# Patient Record
Sex: FEMALE | Race: WHITE | NOT HISPANIC OR LATINO | Employment: FULL TIME | URBAN - NONMETROPOLITAN AREA
[De-identification: names, ages, dates, MRNs, and addresses within clinical notes are randomized per-mention and may not be internally consistent; named-entity substitution may affect disease eponyms.]

---

## 2017-01-11 RX ORDER — ESCITALOPRAM OXALATE 20 MG/1
TABLET ORAL
Qty: 30 TABLET | Refills: 6 | Status: SHIPPED | OUTPATIENT
Start: 2017-01-11 | End: 2017-01-16 | Stop reason: SDUPTHER

## 2017-01-13 PROBLEM — E78.00 ELEVATED CHOLESTEROL: Status: ACTIVE | Noted: 2017-01-13

## 2017-01-13 PROBLEM — R51.9 HEADACHE: Status: ACTIVE | Noted: 2017-01-13

## 2017-01-13 PROBLEM — D75.839 THROMBOCYTOSIS: Status: ACTIVE | Noted: 2017-01-13

## 2017-01-13 PROBLEM — G43.109 OCULAR MIGRAINE: Status: ACTIVE | Noted: 2017-01-13

## 2017-01-13 PROBLEM — E55.9 VITAMIN D DEFICIENCY: Status: ACTIVE | Noted: 2017-01-13

## 2017-01-13 PROBLEM — F39 EPISODIC MOOD DISORDER (HCC): Status: ACTIVE | Noted: 2017-01-13

## 2017-11-13 ENCOUNTER — TELEPHONE (OUTPATIENT)
Dept: URGENT CARE | Facility: CLINIC | Age: 37
End: 2017-11-13

## 2017-11-13 NOTE — TELEPHONE ENCOUNTER
Attempted to contact patient , message left that labs were negative and that for continued problems follow up with family doctor or GYN. Contact UC as needed for any questions or concerns.

## 2017-11-13 NOTE — TELEPHONE ENCOUNTER
----- Message from Geetha Carrera MD sent at 11/11/2017  7:49 PM EST -----  nuswab negative for yeast and BV; urine cx did not grow pathogenic bacteria; if symptoms persist, recommend re-evaluation

## 2017-11-15 ENCOUNTER — OFFICE VISIT (OUTPATIENT)
Dept: INTERNAL MEDICINE | Facility: CLINIC | Age: 37
End: 2017-11-15

## 2017-11-15 VITALS
RESPIRATION RATE: 12 BRPM | DIASTOLIC BLOOD PRESSURE: 66 MMHG | WEIGHT: 134.19 LBS | BODY MASS INDEX: 23.78 KG/M2 | SYSTOLIC BLOOD PRESSURE: 110 MMHG | TEMPERATURE: 97.5 F | HEART RATE: 86 BPM | OXYGEN SATURATION: 97 % | HEIGHT: 63 IN

## 2017-11-15 DIAGNOSIS — Z00.00 PHYSICAL EXAM, ANNUAL: Primary | ICD-10-CM

## 2017-11-15 DIAGNOSIS — F41.9 ANXIETY: ICD-10-CM

## 2017-11-15 PROBLEM — R51.9 HEADACHE: Status: RESOLVED | Noted: 2017-01-13 | Resolved: 2017-11-15

## 2017-11-15 LAB
BILIRUB BLD-MCNC: NEGATIVE MG/DL
CLARITY, POC: CLEAR
COLOR UR: YELLOW
GLUCOSE UR STRIP-MCNC: NEGATIVE MG/DL
KETONES UR QL: NEGATIVE
LEUKOCYTE EST, POC: NEGATIVE
NITRITE UR-MCNC: NEGATIVE MG/ML
PH UR: 8 [PH] (ref 5–8)
PROT UR STRIP-MCNC: NEGATIVE MG/DL
RBC # UR STRIP: NEGATIVE /UL
SP GR UR: 1.01 (ref 1–1.03)
UROBILINOGEN UR QL: NORMAL

## 2017-11-15 PROCEDURE — 99395 PREV VISIT EST AGE 18-39: CPT | Performed by: NURSE PRACTITIONER

## 2017-11-15 PROCEDURE — 81003 URINALYSIS AUTO W/O SCOPE: CPT | Performed by: NURSE PRACTITIONER

## 2017-11-15 RX ORDER — BUSPIRONE HYDROCHLORIDE 7.5 MG/1
7.5 TABLET ORAL 2 TIMES DAILY PRN
Qty: 60 TABLET | Refills: 0 | Status: SHIPPED | OUTPATIENT
Start: 2017-11-15 | End: 2017-12-11 | Stop reason: SDUPTHER

## 2017-11-15 RX ORDER — ESCITALOPRAM OXALATE 20 MG/1
20 TABLET ORAL DAILY
Qty: 30 TABLET | Refills: 5 | Status: SHIPPED | OUTPATIENT
Start: 2017-11-15 | End: 2018-05-15 | Stop reason: SDUPTHER

## 2017-12-08 LAB
BUN SERPL-MCNC: 14 MG/DL (ref 7–20)
BUN/CREAT SERPL: 17.5 (ref 7.1–23.5)
CALCIUM SERPL-MCNC: 10 MG/DL (ref 8.4–10.2)
CHLORIDE SERPL-SCNC: 106 MMOL/L (ref 98–107)
CHOLEST SERPL-MCNC: 187 MG/DL (ref 0–199)
CO2 SERPL-SCNC: 23 MMOL/L (ref 26–30)
CREAT SERPL-MCNC: 0.8 MG/DL (ref 0.6–1.3)
ERYTHROCYTE [DISTWIDTH] IN BLOOD BY AUTOMATED COUNT: 12.4 % (ref 11.5–14.5)
GFR SERPLBLD CREATININE-BSD FMLA CKD-EPI: 80 ML/MIN/1.73
GFR SERPLBLD CREATININE-BSD FMLA CKD-EPI: 97 ML/MIN/1.73
GLUCOSE SERPL-MCNC: 97 MG/DL (ref 74–98)
HCT VFR BLD AUTO: 38.4 % (ref 37–47)
HDLC SERPL-MCNC: 59 MG/DL (ref 40–60)
HGB BLD-MCNC: 12.8 G/DL (ref 12–16)
LDLC SERPL CALC-MCNC: 103 MG/DL (ref 0–99)
MCH RBC QN AUTO: 30 PG (ref 27–31)
MCHC RBC AUTO-ENTMCNC: 33.3 G/DL (ref 30–37)
MCV RBC AUTO: 90.1 FL (ref 81–99)
PLATELET # BLD AUTO: 387 10*3/MM3 (ref 130–400)
POTASSIUM SERPL-SCNC: 4.5 MMOL/L (ref 3.5–5.1)
RBC # BLD AUTO: 4.26 10*6/MM3 (ref 4.2–5.4)
SODIUM SERPL-SCNC: 141 MMOL/L (ref 137–145)
TRIGL SERPL-MCNC: 125 MG/DL
TSH SERPL DL<=0.005 MIU/L-ACNC: 1.7 MIU/ML (ref 0.47–4.68)
VLDLC SERPL CALC-MCNC: 25 MG/DL
WBC # BLD AUTO: 10.43 10*3/MM3 (ref 4.8–10.8)

## 2017-12-11 DIAGNOSIS — F41.9 ANXIETY: ICD-10-CM

## 2017-12-12 RX ORDER — BUSPIRONE HYDROCHLORIDE 7.5 MG/1
TABLET ORAL
Qty: 60 TABLET | Refills: 0 | Status: SHIPPED | OUTPATIENT
Start: 2017-12-12 | End: 2018-01-17 | Stop reason: SDUPTHER

## 2017-12-13 ENCOUNTER — OFFICE VISIT (OUTPATIENT)
Dept: INTERNAL MEDICINE | Facility: CLINIC | Age: 37
End: 2017-12-13

## 2017-12-13 VITALS
BODY MASS INDEX: 22.71 KG/M2 | DIASTOLIC BLOOD PRESSURE: 74 MMHG | RESPIRATION RATE: 14 BRPM | HEIGHT: 63 IN | WEIGHT: 128.19 LBS | SYSTOLIC BLOOD PRESSURE: 112 MMHG | TEMPERATURE: 97.4 F | HEART RATE: 84 BPM | OXYGEN SATURATION: 98 %

## 2017-12-13 DIAGNOSIS — F41.9 ANXIETY: ICD-10-CM

## 2017-12-13 DIAGNOSIS — M54.50 LEFT-SIDED LOW BACK PAIN WITHOUT SCIATICA, UNSPECIFIED CHRONICITY: ICD-10-CM

## 2017-12-13 DIAGNOSIS — Z09 FOLLOW UP: Primary | ICD-10-CM

## 2017-12-13 PROCEDURE — 99214 OFFICE O/P EST MOD 30 MIN: CPT | Performed by: NURSE PRACTITIONER

## 2017-12-13 NOTE — PROGRESS NOTES
Chief Complaint / Reason:      Chief Complaint   Patient presents with   • Med Management     f/u-1 mo.    • Back Pain     left side only, was seen at [facility], prescribed Macrobid       Subjective     HPI  Patient presents today for one-month follow-up regarding anxiety and reports left-sided back pain.  She was seen on 12/9/17 for back pain at urgent care and was prescribed Macrobid and urine was cultured which revealed no growth.  Patient states that she had still been taking the Macrobid and is wondering if she could discontinue it.  In regards to her anxiety she states that she was not doing well at all and had to miss a lot of work and felt very panicky did not go out of the house some days did not get off the couch but since she started taking the BuSpar she is starting to feel better and is starting to eat and feel more like herself.  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  Patient has a long-standing history of anxiety and she has been on Paxil and Zoloft without any relief.  She is currently on Lexapro at 20 mg daily.  She denies SI or HI.  Denies chest pain, shortness of breath or heart palpitations.  History taken from: patient    PMH/FH/Social History were reviewed and updated appropriately in the electronic medical record.     Review of Systems:   Review of Systems   Constitutional: Positive for fatigue.   Respiratory: Negative.    Cardiovascular: Negative.    Gastrointestinal: Negative.    Musculoskeletal: Negative.    Psychiatric/Behavioral: Positive for dysphoric mood and sleep disturbance. Negative for self-injury and suicidal ideas. The patient is nervous/anxious.      All other systems were reviewed and are negative.  Exceptions are noted in the subjective or above.      Objective     Vital Signs  Vitals:    12/13/17 1505   BP: 112/74   Pulse: 84   Resp: 14   Temp: 97.4 °F (36.3 °C)   SpO2:  98%       Body mass index is 22.71 kg/(m^2).    Physical Exam   Constitutional: She is oriented to person, place, and time. She appears well-developed and well-nourished. No distress.   Cardiovascular: Normal rate, regular rhythm, normal heart sounds and intact distal pulses.    Pulmonary/Chest: Effort normal and breath sounds normal. She has no wheezes. She exhibits no tenderness.   Neurological: She is alert and oriented to person, place, and time.   Skin: Skin is warm and dry. No rash noted. No erythema. No pallor.   Psychiatric: Her behavior is normal. Judgment and thought content normal. Her mood appears anxious.   Nursing note and vitals reviewed.       Results Review:    I reviewed the patient's previous clinical results.   No visits with results within 2 Day(s) from this visit.  Latest known visit with results is:    Admission on 12/09/2017, Discharged on 12/09/2017   Component Date Value Ref Range Status   • Color 12/09/2017 Straw  Yellow, Straw, Dark Yellow, Arianne Final   • Clarity, UA 12/09/2017 Cloudy* Clear Final   • Glucose, UA 12/09/2017 Negative  Negative, 1000 mg/dL (3+) mg/dL Final   • Bilirubin 12/09/2017 Negative  Negative Final   • Ketones, UA 12/09/2017 2+* Negative Final   • Specific Gravity  12/09/2017 1.030  1.005 - 1.030 Final   • Blood, UA 12/09/2017 1+* Negative Final   • pH, Urine 12/09/2017 6.0  5.0 - 8.0 Final   • Protein, POC 12/09/2017 1+* Negative mg/dL Final   • Urobilinogen, UA 12/09/2017 Normal  Normal Final   • Leukocytes 12/09/2017 Trace* Negative Final   • Nitrite, UA 12/09/2017 Negative  Negative Final   • Urine Culture 12/09/2017 Final report   Final   • Result 1 12/09/2017 No growth   Final         Medication Review:     Current Outpatient Prescriptions:   •  busPIRone (BUSPAR) 7.5 MG tablet, take 1 tablet by mouth twice a day if needed for anxiety, Disp: 60 tablet, Rfl: 0  •  escitalopram (LEXAPRO) 20 MG tablet, Take 1 tablet by mouth Daily., Disp: 30 tablet, Rfl: 5  •   nitrofurantoin (MACRODANTIN) 50 MG capsule, Take 2 capsules by mouth 2 (Two) Times a Day for 7 days., Disp: 28 capsule, Rfl: 0    Assessment/Plan   Comfort was seen today for med management and back pain.    Diagnoses and all orders for this visit:    Follow up    Anxiety  Continue BuSpar as prescribed.  Discussed worsening signs and symptoms and recommend stress management for patient.  Offered counseling referral.  Discussed [facility] and discussed processed with patient and explained the process.  She indicated she wanted to proceed.  Left-sided low back pain without sciatica, unspecified chronicity  Recommend patient increase water intake and avoid constipation.  Recommend patient discontinue Macrobid as her urine culture did not have any growth from urgent care      Return in about 4 weeks (around 1/10/2018).    Rocío Harrison, APRN  12/13/2017

## 2017-12-18 ENCOUNTER — TELEPHONE (OUTPATIENT)
Dept: INTERNAL MEDICINE | Facility: CLINIC | Age: 37
End: 2017-12-18

## 2017-12-18 NOTE — TELEPHONE ENCOUNTER
Lorenza with [employer] (?) called re pt. filing for a medical leave. Need to verify and ask some ? over the phone or can send us paperwork to fill out. Need ICD-10 code, 1st day out, return to work date, etc.

## 2017-12-26 ENCOUNTER — TELEPHONE (OUTPATIENT)
Dept: INTERNAL MEDICINE | Facility: CLINIC | Age: 37
End: 2017-12-26

## 2017-12-27 NOTE — TELEPHONE ENCOUNTER
Will you please provide patient with a letter indicating that she can return to work if you have the time. Thanks

## 2018-01-02 ENCOUNTER — TELEPHONE (OUTPATIENT)
Dept: INTERNAL MEDICINE | Facility: CLINIC | Age: 38
End: 2018-01-02

## 2018-01-02 NOTE — TELEPHONE ENCOUNTER
Calling in regards to a medical release form so that she can return to work..  Please return call to pt.

## 2018-01-17 ENCOUNTER — OFFICE VISIT (OUTPATIENT)
Dept: INTERNAL MEDICINE | Facility: CLINIC | Age: 38
End: 2018-01-17

## 2018-01-17 VITALS
OXYGEN SATURATION: 98 % | HEIGHT: 63 IN | TEMPERATURE: 98.1 F | HEART RATE: 98 BPM | DIASTOLIC BLOOD PRESSURE: 70 MMHG | SYSTOLIC BLOOD PRESSURE: 118 MMHG | BODY MASS INDEX: 24.45 KG/M2 | WEIGHT: 138 LBS

## 2018-01-17 DIAGNOSIS — F41.9 ANXIETY: ICD-10-CM

## 2018-01-17 PROCEDURE — 99213 OFFICE O/P EST LOW 20 MIN: CPT | Performed by: INTERNAL MEDICINE

## 2018-01-17 RX ORDER — BUSPIRONE HYDROCHLORIDE 7.5 MG/1
7.5 TABLET ORAL 2 TIMES DAILY PRN
Qty: 60 TABLET | Refills: 5 | Status: SHIPPED | OUTPATIENT
Start: 2018-01-17 | End: 2018-05-25 | Stop reason: SDUPTHER

## 2018-01-17 NOTE — PROGRESS NOTES
"Chief Complaint   Patient presents with   • Follow-up     4 weeks for back pain. Pt states she's no longer having back pain.      Subjective   Comfort Richter is a 37 y.o. female.     HPI Comments: Pt was seen here 4 weeks ago for back pain and anxiety per NP.   Her back pain is now resolved.   She was given buspar for her anxiety and it worked well.  She took it twice a day for a while and now is taking it as needed.   She remains on lexapro daily with prn BuSpar.  This with the prn buspar is working well for her currently.  She is sleeping well.   Genetic testing showed lexapro was \"moderate\" and buspar was \"use as directed\" in regards to genetic interaction testing for psychiatric drugs.   Interestingly, patient was on Zoloft in the past which is found to be \"use as directed\" for her depression and it did not work as well as Lexapro does for her.  She is not currently taking any vit D .  She has no complaints today.  The main reason she came in was to get results from her genetic testing for her psychiatric drugs.         The following portions of the patient's history were reviewed and updated as appropriate: allergies, current medications, past family history, past medical history, past social history, past surgical history and problem list.    Review of Systems   Musculoskeletal: Negative for back pain.   Psychiatric/Behavioral: Negative for dysphoric mood and sleep disturbance. The patient is not nervous/anxious.        Objective   /70  Pulse 98  Temp 98.1 °F (36.7 °C)  Ht 160 cm (63\")  Wt 62.6 kg (138 lb)  SpO2 98%  BMI 24.45 kg/m2  Body mass index is 24.45 kg/(m^2).  Physical Exam   Constitutional: She is oriented to person, place, and time. She appears well-developed and well-nourished.   HENT:   Head: Normocephalic and atraumatic.   Eyes: EOM are normal. Pupils are equal, round, and reactive to light.   Pulmonary/Chest: Effort normal.   Neurological: She is alert and oriented to person, " place, and time.   Psychiatric: She has a normal mood and affect. Her behavior is normal. Judgment and thought content normal.   Nursing note and vitals reviewed.      Assessment/Plan   Comfort Richter is here today and the following problems have been addressed:      Comfort was seen today for follow-up.    Diagnoses and all orders for this visit:    Anxiety  -     busPIRone (BUSPAR) 7.5 MG tablet; Take 1 tablet by mouth 2 (Two) Times a Day As Needed (anxiety).    Continue lexapro and buspar at current dose  Reviewed recent genetic testing with pt  Recommend vit D 2000 u daily in winter and 1000 u daily in summer months    RTC 6 mo or prn  Please note that portions of this note were completed with a voice recognition program.  Efforts were made to edit dictation, but occasionally words are mistranscribed.

## 2018-05-15 DIAGNOSIS — F41.9 ANXIETY: ICD-10-CM

## 2018-05-16 RX ORDER — ESCITALOPRAM OXALATE 20 MG/1
TABLET ORAL
Qty: 30 TABLET | Refills: 3 | Status: SHIPPED | OUTPATIENT
Start: 2018-05-16 | End: 2018-05-25 | Stop reason: SDUPTHER

## 2018-05-25 ENCOUNTER — OFFICE VISIT (OUTPATIENT)
Dept: INTERNAL MEDICINE | Facility: CLINIC | Age: 38
End: 2018-05-25

## 2018-05-25 VITALS
HEART RATE: 106 BPM | HEIGHT: 63 IN | OXYGEN SATURATION: 100 % | SYSTOLIC BLOOD PRESSURE: 126 MMHG | DIASTOLIC BLOOD PRESSURE: 82 MMHG | BODY MASS INDEX: 24.27 KG/M2 | TEMPERATURE: 98.7 F | WEIGHT: 137 LBS

## 2018-05-25 DIAGNOSIS — F41.9 ANXIETY: ICD-10-CM

## 2018-05-25 DIAGNOSIS — F39 EPISODIC MOOD DISORDER (HCC): Primary | ICD-10-CM

## 2018-05-25 PROBLEM — D75.839 THROMBOCYTOSIS: Status: RESOLVED | Noted: 2017-01-13 | Resolved: 2018-05-25

## 2018-05-25 PROBLEM — E78.00 ELEVATED CHOLESTEROL: Status: RESOLVED | Noted: 2017-01-13 | Resolved: 2018-05-25

## 2018-05-25 PROCEDURE — 99213 OFFICE O/P EST LOW 20 MIN: CPT | Performed by: INTERNAL MEDICINE

## 2018-05-25 RX ORDER — BUSPIRONE HYDROCHLORIDE 7.5 MG/1
7.5 TABLET ORAL 2 TIMES DAILY PRN
Qty: 60 TABLET | Refills: 5 | Status: SHIPPED | OUTPATIENT
Start: 2018-05-25 | End: 2019-01-23 | Stop reason: SDUPTHER

## 2018-05-25 RX ORDER — ESCITALOPRAM OXALATE 20 MG/1
20 TABLET ORAL DAILY
Qty: 30 TABLET | Refills: 6 | Status: SHIPPED | OUTPATIENT
Start: 2018-05-25 | End: 2018-09-24 | Stop reason: SDUPTHER

## 2018-05-25 NOTE — PROGRESS NOTES
"Chief Complaint   Patient presents with   • Follow-up     med refills     Subjective   Comfort Richter is a 37 y.o. female.     Patient is here today for follow-up of anxiety.  She remains on Lexapro and uses BuSpar when necessary for underlying anxiety symptoms.  Her HR is up some today.    She does not take her buspar nearly as often.  She may use the buspar once a week and usually at work.   She likes the lexapro.  She denies any depression, has mostly anxiety.   She sleeps well overall.   Her last GYN appt was about 3 yrs ago.  She will make appt.   She only gets a migraine or HA if she over sleeps or is stressed.            The following portions of the patient's history were reviewed and updated as appropriate: allergies, current medications, past family history, past medical history, past social history, past surgical history and problem list.    Review of Systems   Constitutional: Negative for activity change, appetite change and unexpected weight change.   Respiratory: Negative for chest tightness.    Cardiovascular: Negative for palpitations.   Genitourinary: Negative for menstrual problem.   Psychiatric/Behavioral: Negative for dysphoric mood and sleep disturbance. The patient is nervous/anxious.    All other systems reviewed and are negative.      Objective   /82 (BP Location: Left arm, Patient Position: Sitting)   Pulse 106   Temp 98.7 °F (37.1 °C)   Ht 160 cm (63\")   Wt 62.1 kg (137 lb)   SpO2 100%   BMI 24.27 kg/m²   Body mass index is 24.27 kg/m².  Physical Exam   Constitutional: She is oriented to person, place, and time. She appears well-developed and well-nourished.   HENT:   Head: Normocephalic and atraumatic.   Eyes: EOM are normal. Pupils are equal, round, and reactive to light.   Pulmonary/Chest: Effort normal.   Neurological: She is alert and oriented to person, place, and time.   Psychiatric: She has a normal mood and affect. Her behavior is normal. Judgment and thought content " normal.   Nursing note and vitals reviewed.      Assessment/Plan   Comfort Richter is here today and the following problems have been addressed:      Comfort was seen today for follow-up.    Diagnoses and all orders for this visit:    Episodic mood disorder    Anxiety  -     escitalopram (LEXAPRO) 20 MG tablet; Take 1 tablet by mouth Daily.  -     busPIRone (BUSPAR) 7.5 MG tablet; Take 1 tablet by mouth 2 (Two) Times a Day As Needed (anxiety).    given RF on both lexapro and buspar  Reassurance and encouragement given  She will make appt with GYN for pap soon  Recommend vit d 1000 u daily    RTC 6 mo or prn    Please note that portions of this note were completed with a voice recognition program.  Efforts were made to edit dictation, but occasionally words are mistranscribed.

## 2018-09-24 DIAGNOSIS — F41.9 ANXIETY: ICD-10-CM

## 2018-09-24 RX ORDER — ESCITALOPRAM OXALATE 20 MG/1
TABLET ORAL
Qty: 30 TABLET | Refills: 2 | Status: SHIPPED | OUTPATIENT
Start: 2018-09-24 | End: 2018-12-29 | Stop reason: SDUPTHER

## 2018-10-25 ENCOUNTER — TELEPHONE (OUTPATIENT)
Dept: INTERNAL MEDICINE | Facility: CLINIC | Age: 38
End: 2018-10-25

## 2018-10-25 NOTE — TELEPHONE ENCOUNTER
Patient called requesting call back. Patient states that Dr. Vermeesch had suggested a vitamin for her to take and she doesn't remember whether it was Vitamin C or Vitamin D. She would like to purchase vitamins soon but would like to know which of these was suggested. Please advise.

## 2018-12-29 DIAGNOSIS — F41.9 ANXIETY: ICD-10-CM

## 2018-12-29 RX ORDER — ESCITALOPRAM OXALATE 20 MG/1
20 TABLET ORAL DAILY
Qty: 30 TABLET | Refills: 0 | Status: SHIPPED | OUTPATIENT
Start: 2018-12-29 | End: 2019-01-23 | Stop reason: SDUPTHER

## 2019-01-02 ENCOUNTER — TELEPHONE (OUTPATIENT)
Dept: INTERNAL MEDICINE | Facility: CLINIC | Age: 39
End: 2019-01-02

## 2019-01-23 ENCOUNTER — OFFICE VISIT (OUTPATIENT)
Dept: INTERNAL MEDICINE | Facility: CLINIC | Age: 39
End: 2019-01-23

## 2019-01-23 VITALS
SYSTOLIC BLOOD PRESSURE: 124 MMHG | BODY MASS INDEX: 23.57 KG/M2 | DIASTOLIC BLOOD PRESSURE: 78 MMHG | HEART RATE: 100 BPM | HEIGHT: 63 IN | TEMPERATURE: 98.2 F | OXYGEN SATURATION: 98 % | WEIGHT: 133 LBS

## 2019-01-23 DIAGNOSIS — Z00.00 PHYSICAL EXAM: Primary | ICD-10-CM

## 2019-01-23 DIAGNOSIS — F39 EPISODIC MOOD DISORDER (HCC): ICD-10-CM

## 2019-01-23 DIAGNOSIS — E55.9 VITAMIN D DEFICIENCY: ICD-10-CM

## 2019-01-23 DIAGNOSIS — F41.9 ANXIETY: ICD-10-CM

## 2019-01-23 DIAGNOSIS — Z12.4 PAPANICOLAOU SMEAR FOR CERVICAL CANCER SCREENING: ICD-10-CM

## 2019-01-23 PROCEDURE — 99395 PREV VISIT EST AGE 18-39: CPT | Performed by: INTERNAL MEDICINE

## 2019-01-23 RX ORDER — ESCITALOPRAM OXALATE 20 MG/1
20 TABLET ORAL DAILY
Qty: 30 TABLET | Refills: 5 | Status: SHIPPED | OUTPATIENT
Start: 2019-01-23 | End: 2019-08-05 | Stop reason: SDUPTHER

## 2019-01-23 RX ORDER — BUSPIRONE HYDROCHLORIDE 7.5 MG/1
7.5 TABLET ORAL 2 TIMES DAILY PRN
Qty: 60 TABLET | Refills: 5 | Status: SHIPPED | OUTPATIENT
Start: 2019-01-23 | End: 2019-08-20 | Stop reason: SDUPTHER

## 2019-01-23 NOTE — PROGRESS NOTES
Chief Complaint   Patient presents with   • Annual Exam     Physical, pap, and breast exam.      Subjective   Comfort Richter is a 38 y.o. female.     Pt is here today for annual PE.   Past medical history of depression, vitamin D deficiency and ocular migraines.  HCM- patient declines flu shot today. She has had her Hep a vaccine.   Depression- she is on lexapro and rarely uses buspar.  She has moved to [place].  She is in midst of divorce. 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    Vit d def- she is taking vit d 1000 u daily  Ocular migraines- she has not had a HA for over a yr.   No recent illness.   She is not eating healthy.  She is joining gym today with [employer].   She has seen dentist.  Has not seen eye doctor for a while.   She does wear her seatbelt.  She does occasionally text and drive.   She has a smoke detector.     Her LMP was 2 weeks ago.  No current birth control and not currently sexually active.  No history of abnormal pap.  No FH of GYN cancer or colon cancer.   She is .          The following portions of the patient's history were reviewed and updated as appropriate: allergies, current medications, past family history, past medical history, past social history, past surgical history and problem list.    Review of Systems   Constitutional: Negative for activity change, appetite change and unexpected weight change.   HENT: Negative for hearing loss, tinnitus, trouble swallowing and voice change.    Eyes: Negative for visual disturbance.   Respiratory: Negative for shortness of breath.    Cardiovascular: Negative for chest pain, palpitations and leg swelling.   Gastrointestinal: Negative for abdominal pain.   Endocrine: Negative for cold intolerance and heat intolerance.   Genitourinary: Negative for difficulty urinating and menstrual problem.   Musculoskeletal: Negative for back pain.   Neurological: Negative for headaches.   Psychiatric/Behavioral: Negative for dysphoric  "mood and sleep disturbance. The patient is not nervous/anxious.    All other systems reviewed and are negative.      Objective   /78   Pulse 100   Temp 98.2 °F (36.8 °C)   Ht 160 cm (63\")   Wt 60.3 kg (133 lb)   SpO2 98%   BMI 23.56 kg/m²   Body mass index is 23.56 kg/m².  Physical Exam   Constitutional: She is oriented to person, place, and time. She appears well-developed and well-nourished. No distress.   Very pleasant young lady in no distress today   HENT:   Head: Normocephalic and atraumatic.   Right Ear: External ear normal.   Left Ear: External ear normal.   Mouth/Throat: Oropharynx is clear and moist. No oropharyngeal exudate.   TMs normal bilaterally   Eyes: EOM are normal. Pupils are equal, round, and reactive to light.   Neck: Normal range of motion. Neck supple.   Cardiovascular: Normal rate, regular rhythm, normal heart sounds and intact distal pulses.   No murmur heard.  Pulmonary/Chest: Effort normal and breath sounds normal. Right breast exhibits no inverted nipple, no mass, no nipple discharge, no skin change and no tenderness. Left breast exhibits no inverted nipple, no mass, no nipple discharge, no skin change and no tenderness.   Abdominal: Soft. Bowel sounds are normal. She exhibits no distension and no mass. There is no tenderness.   Genitourinary: Vagina normal and uterus normal. No vaginal discharge found.   Genitourinary Comments: Friable cervical mucosa noted with easy bleeding when Pap obtained   Musculoskeletal: She exhibits no edema.   Lymphadenopathy:     She has no cervical adenopathy.   Neurological: She is alert and oriented to person, place, and time. She displays normal reflexes. No cranial nerve deficit. Coordination normal.   Skin: No rash noted.   Psychiatric: She has a normal mood and affect. Her behavior is normal. Judgment and thought content normal.   Nursing note and vitals reviewed.      Assessment/Plan   Comfort Richter is here today and the following " problems have been addressed:      Comfort was seen today for annual exam.    Diagnoses and all orders for this visit:    Physical exam    Anxiety  -     busPIRone (BUSPAR) 7.5 MG tablet; Take 1 tablet by mouth 2 (Two) Times a Day As Needed (anxiety).  -     escitalopram (LEXAPRO) 20 MG tablet; Take 1 tablet by mouth Daily.    Papanicolaou smear for cervical cancer screening    Episodic mood disorder (CMS/HCC)    Vitamin D deficiency    Labs as noted  Recommend avoidance of processed foods  Exercise for 30 minutes 5 days a week as tolerated  Recommend annual eye doctor appointment, or as necessary  See your dentist twice a year.  Recommend that you brush teeth twice daily and floss minimum of once daily  Recommend regular seatbelt use  Do not text or use phone while driving  Pap and breast exam completed today, we will contact patient with Pap results  Continue daily vit D  Doing well on lexapro  Encouragement and reassurance given to patient today regarding her current home stressors    Return to clinic in 6 months for routine follow-up for depression    Please note that portions of this note were completed with a voice recognition program.  Efforts were made to edit dictation, but occasionally words are mistranscribed.

## 2019-08-05 DIAGNOSIS — F41.9 ANXIETY: ICD-10-CM

## 2019-08-05 RX ORDER — ESCITALOPRAM OXALATE 20 MG/1
20 TABLET ORAL DAILY
Qty: 30 TABLET | Refills: 0 | Status: SHIPPED | OUTPATIENT
Start: 2019-08-05 | End: 2019-08-20 | Stop reason: SDUPTHER

## 2019-08-20 ENCOUNTER — OFFICE VISIT (OUTPATIENT)
Dept: INTERNAL MEDICINE | Facility: CLINIC | Age: 39
End: 2019-08-20

## 2019-08-20 VITALS
WEIGHT: 139 LBS | HEIGHT: 63 IN | HEART RATE: 91 BPM | DIASTOLIC BLOOD PRESSURE: 82 MMHG | OXYGEN SATURATION: 99 % | TEMPERATURE: 98.1 F | BODY MASS INDEX: 24.63 KG/M2 | SYSTOLIC BLOOD PRESSURE: 130 MMHG

## 2019-08-20 DIAGNOSIS — F40.243 ANXIETY WITH FLYING: ICD-10-CM

## 2019-08-20 DIAGNOSIS — F41.9 ANXIETY: ICD-10-CM

## 2019-08-20 DIAGNOSIS — F39 EPISODIC MOOD DISORDER (HCC): Primary | ICD-10-CM

## 2019-08-20 PROCEDURE — 99213 OFFICE O/P EST LOW 20 MIN: CPT | Performed by: INTERNAL MEDICINE

## 2019-08-20 RX ORDER — BUSPIRONE HYDROCHLORIDE 7.5 MG/1
7.5 TABLET ORAL 2 TIMES DAILY PRN
Qty: 20 TABLET | Refills: 1 | Status: SHIPPED | OUTPATIENT
Start: 2019-08-20 | End: 2020-09-02

## 2019-08-20 RX ORDER — ESCITALOPRAM OXALATE 20 MG/1
20 TABLET ORAL DAILY
Qty: 30 TABLET | Refills: 6 | Status: SHIPPED | OUTPATIENT
Start: 2019-08-20 | End: 2020-04-07

## 2019-08-20 RX ORDER — LORAZEPAM 0.5 MG/1
0.5 TABLET ORAL DAILY PRN
Qty: 2 TABLET | Refills: 0 | Status: SHIPPED | OUTPATIENT
Start: 2019-08-20 | End: 2020-12-10

## 2019-08-20 NOTE — PROGRESS NOTES
"Chief Complaint   Patient presents with   • Abstract     6 month     Subjective   Comfort Marlow is a 38 y.o. female.     Here today for followup of depression/anxiety.  She is doing well on lexapro at current dose.  She rarely takes the buspar.   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  She is handling stress very well.  She is sleeping well overall.    No recent migraines.   She is taking her vit D regularly.    She is going to fly next month for the first time.  She is very anxious about flying and would like to know if I could give her something for this.    No current complaints today.           The following portions of the patient's history were reviewed and updated as appropriate: allergies, current medications, past family history, past medical history, past social history, past surgical history and problem list.    Review of Systems   Constitutional: Negative for activity change, appetite change and unexpected weight change.   Neurological: Negative for headaches.   Psychiatric/Behavioral: Negative for dysphoric mood and sleep disturbance. The patient is not nervous/anxious.        Objective   /82   Pulse 91   Temp 98.1 °F (36.7 °C) (Temporal)   Ht 160 cm (63\")   Wt 63 kg (139 lb)   SpO2 99%   BMI 24.62 kg/m²   Body mass index is 24.62 kg/m².  Physical Exam   Constitutional: She is oriented to person, place, and time. She appears well-developed and well-nourished. No distress.   Very pleasant female who appears her stated age   HENT:   Head: Normocephalic and atraumatic.   Eyes: EOM are normal.   Pulmonary/Chest: Effort normal.   Neurological: She is alert and oriented to person, place, and time. No cranial nerve deficit. Coordination normal.   Psychiatric: She has a normal mood and affect. Her behavior is normal. Judgment and thought content normal.   Upbeat mood today with no signs of depression or anxiety   Nursing note and vitals reviewed.      Assessment/Plan "   Comfort Marlow is here today and the following problems have been addressed:      Comfort was seen today for abstract.    Diagnoses and all orders for this visit:    Episodic mood disorder (CMS/HCC)    Anxiety  -     escitalopram (LEXAPRO) 20 MG tablet; Take 1 tablet by mouth Daily.  -     busPIRone (BUSPAR) 7.5 MG tablet; Take 1 tablet by mouth 2 (Two) Times a Day As Needed (anxiety).    Anxiety with flying    Other orders  -     LORazepam (ATIVAN) 0.5 MG tablet; Take 1 tablet by mouth Daily As Needed for Anxiety.    Continue Lexapro 20 mg daily with PRN BuSpar for anxiety  Patient signed a controlled drug contract today  She was given Ativan 0.5 mg to take on flight to [place], number 2 tablets given    Return to clinic in 6 months or as needed    Please note that portions of this note were completed with a voice recognition program.  Efforts were made to edit dictation, but occasionally words are mistranscribed.

## 2020-04-07 DIAGNOSIS — F41.9 ANXIETY: ICD-10-CM

## 2020-04-07 RX ORDER — ESCITALOPRAM OXALATE 20 MG/1
20 TABLET ORAL DAILY
Qty: 30 TABLET | Refills: 6 | Status: SHIPPED | OUTPATIENT
Start: 2020-04-07 | End: 2020-10-29

## 2020-08-20 RX ORDER — LORAZEPAM 0.5 MG/1
TABLET ORAL
Qty: 2 TABLET | OUTPATIENT
Start: 2020-08-20

## 2020-09-02 DIAGNOSIS — F41.9 ANXIETY: ICD-10-CM

## 2020-09-02 RX ORDER — BUSPIRONE HYDROCHLORIDE 7.5 MG/1
7.5 TABLET ORAL 2 TIMES DAILY
Qty: 60 TABLET | Refills: 0 | Status: SHIPPED | OUTPATIENT
Start: 2020-09-02 | End: 2020-12-10

## 2020-10-29 DIAGNOSIS — F41.9 ANXIETY: ICD-10-CM

## 2020-10-29 RX ORDER — ESCITALOPRAM OXALATE 20 MG/1
20 TABLET ORAL DAILY
Qty: 30 TABLET | Refills: 0 | Status: SHIPPED | OUTPATIENT
Start: 2020-10-29 | End: 2020-12-10 | Stop reason: SDUPTHER

## 2020-12-10 ENCOUNTER — TELEMEDICINE (OUTPATIENT)
Dept: INTERNAL MEDICINE | Facility: CLINIC | Age: 40
End: 2020-12-10

## 2020-12-10 DIAGNOSIS — Z12.31 SCREENING MAMMOGRAM, ENCOUNTER FOR: ICD-10-CM

## 2020-12-10 DIAGNOSIS — F39 EPISODIC MOOD DISORDER (HCC): Primary | ICD-10-CM

## 2020-12-10 DIAGNOSIS — F41.9 ANXIETY: ICD-10-CM

## 2020-12-10 PROCEDURE — 99213 OFFICE O/P EST LOW 20 MIN: CPT | Performed by: INTERNAL MEDICINE

## 2020-12-10 RX ORDER — ESCITALOPRAM OXALATE 20 MG/1
20 TABLET ORAL DAILY
Qty: 90 TABLET | Refills: 0 | Status: SHIPPED | OUTPATIENT
Start: 2020-12-10 | End: 2021-03-02 | Stop reason: SDUPTHER

## 2020-12-10 NOTE — PROGRESS NOTES
Chief Complaint   Patient presents with   • Follow-up     anxiety and mood disorder.      Subjective   Comfort Marlow is a 40 y.o. female.     Telemedicine video visit with patient today for follow-up of anxiety and depression and history of ocular migraines.  Patient is currently on Lexapro 20 mg daily.  She was previously on BuSpar however has not been taking this medication.  She rarely has any anxiety.  Her depression is currently well controlled with Lexapro.  She is not having any ocular migraine symptoms.  Since our last visit she had noted a skin lesion on her left upper shoulder that was increasing in size.  She was seen by dermatologist and had biopsy done and this was a stage I melanoma.  It was removed in its entirety.  There was no surrounding lymph nodes and she did not require any further investigation.  She also had a biopsy of lesion on buttock and that is currently pending.  She has had 1 mammogram done at age 35.  It is time for another screening mammogram at this time.  Last Pap was completed in January 2019.  She remains sexually active with the same partner and has no  symptoms.  Next Pap is due in January 2022.  She has no other complaints today.  She will come to clinic in approximately 3 months for a full physical exam.       The following portions of the patient's history were reviewed and updated as appropriate: allergies, current medications, past family history, past medical history, past social history, past surgical history and problem list.    Review of Systems   Eyes: Negative for visual disturbance.   Genitourinary: Negative for menstrual problem.   Skin:        Recent removal of melanoma, see HPI   Neurological: Negative for headaches.   Psychiatric/Behavioral: Negative for dysphoric mood and sleep disturbance. The patient is not nervous/anxious.    All other systems reviewed and are negative.      Objective   There were no vitals taken for this visit.  There is no height or  weight on file to calculate BMI.  Physical Exam  Nursing note reviewed.   Constitutional:       General: She is not in acute distress.     Appearance: Normal appearance. She is not ill-appearing.      Comments: Very kind and pleasant female, she appears her age and in no distress today   HENT:      Head: Normocephalic and atraumatic.      Right Ear: External ear normal.      Left Ear: External ear normal.   Eyes:      General:         Right eye: No discharge.         Left eye: No discharge.      Extraocular Movements: Extraocular movements intact.   Pulmonary:      Effort: Pulmonary effort is normal. No respiratory distress.   Neurological:      Mental Status: She is alert and oriented to person, place, and time.   Psychiatric:         Attention and Perception: Attention normal.         Mood and Affect: Mood and affect normal. Mood is not anxious or depressed.         Speech: Speech normal.         Behavior: Behavior normal.         Thought Content: Thought content normal.         Cognition and Memory: Cognition and memory normal.         Judgment: Judgment normal.         Assessment/Plan   Comfort Marlow is here today and the following problems have been addressed:      Diagnoses and all orders for this visit:    1. Episodic mood disorder (CMS/HCC) (Primary)    2. Anxiety  -     escitalopram (LEXAPRO) 20 MG tablet; Take 1 tablet by mouth Daily.  Dispense: 90 tablet; Refill: 0    3. Screening mammogram, encounter for  -     Mammo Screening Digital Tomosynthesis Bilateral With CAD; Future    Depression and anxiety are doing well on Lexapro 20 mg daily  Patient is rarely using BuSpar and states she does not need refill at this time  She denies any ocular migraines  She is agreeable to screening mammogram, order was sent to [facility] and patient will schedule this    Return to clinic in 3 months for annual physical exam    Please note that portions of this note were completed with a voice recognition program.   Efforts were made to edit dictation, but occasionally words are mistranscribed.You have chosen to receive care through a telehealth visit.  Do you consent to use a video/audio connection for your medical care today? Yes

## 2021-01-19 ENCOUNTER — TELEPHONE (OUTPATIENT)
Dept: INTERNAL MEDICINE | Facility: CLINIC | Age: 41
End: 2021-01-19

## 2021-01-19 NOTE — TELEPHONE ENCOUNTER
Patient had unread MyChart notification; contacted patient and relayed following message::      Hi, this is Veronique with Dr Vermeesch's office in [place]. I tried to call you but I think we  have an old phone number for you. We were calling to let you know that Dr. Vermeesch will not be in office on 2/25/2021, which is when your next appointment is. If you could call the office at your earliest convenience, we would love to help you reschedule!   Thanks!   -Veronique DAVIS         Patient is scheduled with PCP on 01/21/2021 for a physical

## 2021-03-02 DIAGNOSIS — F41.9 ANXIETY: ICD-10-CM

## 2021-03-02 RX ORDER — ESCITALOPRAM OXALATE 20 MG/1
20 TABLET ORAL DAILY
Qty: 90 TABLET | Refills: 0 | Status: SHIPPED | OUTPATIENT
Start: 2021-03-02

## 2021-03-02 NOTE — TELEPHONE ENCOUNTER
RHONA MOVED 02/27/21 TO [place].  SHE  NEEDS TO HAVE HER MEDS RENEWED UNTIL SHE CAN FIND A DR IN [place].  CALL HER [phone].

## 2021-12-23 ENCOUNTER — TELEPHONE (OUTPATIENT)
Dept: INTERNAL MEDICINE | Facility: CLINIC | Age: 41
End: 2021-12-23

## 2021-12-23 NOTE — TELEPHONE ENCOUNTER
Patient did not complete MAMMOGRAM ordered on 12/15/2020. This order is too old to be used. May I cancel the order?